# Patient Record
(demographics unavailable — no encounter records)

---

## 2025-01-21 NOTE — PLAN
[FreeTextEntry1] : Well woman annual examination.  Menorrhagia times multiple years requiring iron infusion therapy. History of thyroid nodules but euthyroid per the patient. A Pap smear is done. Transvaginal sonography suggests a possible  arcuate uterus and adenomyosis. Patient is currently scheduled for embolization of a splenic artery aneurysm. Patient is due for mammography and is referred for the same. She will return after she recuperates from her splenic artery embolization for an endometrial biopsy to assist in management of the heavy menses.

## 2025-01-21 NOTE — HISTORY OF PRESENT ILLNESS
[FreeTextEntry1] : Patient is here for initial examination She is a 41-year-old para 3-0-2-3 with 2 previous  sections.  The initial  was done for fetal distress. She notes that her periods are regular but have always been heavy has required iron infusions.  Apparently, this was never worked up for etiology. She is currently scheduled to undergo embolization of a splenic artery aneurysm which was incidentally discovered on a CT done for abdominal pain. Other than the heavy.  She has no current GYN complaints.  History of 2 spontaneous miscarriages She has been followed in the past for thyroid nodules and is not on any current thyroid medication. There are no allergies Surgical history is  section x 2 and a laparoscopic ovarian cyst, side uncertain Her last mammogram was about 2 years ago Social history is negative Currently abstinent

## 2025-01-21 NOTE — PHYSICAL EXAM
[Chaperone Present] : A chaperone was present in the examining room during all aspects of the physical examination [24313] : A chaperone was present during the pelvic exam. [FreeTextEntry2] : Lizbaeth Mina [Appropriately responsive] : appropriately responsive [Alert] : alert [No Acute Distress] : no acute distress [No Lymphadenopathy] : no lymphadenopathy [Soft] : soft [Non-tender] : non-tender [Non-distended] : non-distended [No HSM] : No HSM [No Lesions] : no lesions [No Mass] : no mass [FreeTextEntry3] : Thyroid feels very slightly enlarged [FreeTextEntry7] : Well-healed umbilical lap uroscopy scar and Pfannenstiel incision [Labia Majora] : normal [Labia Minora] : normal [Normal] : normal [Uterine Adnexae] : normal [FreeTextEntry4] : Mild vaginismus [FreeTextEntry6] : The uterus feels enlarged to approximately 6 weeks size is axial slightly bulky and irregular [FreeTextEntry9] : Rectal exam is negative and guaiac is negative

## 2025-02-07 NOTE — HISTORY OF PRESENT ILLNESS
[FreeTextEntry1] : 41F presents to evaluate RLE pain. History of venous insufficiency and splenic artery aneurysm. She is s/p coil embolization of splenic artery aneurysm on 1/28/25. Complains of intermittent left flank pain which is improving. Also complaining of right upper thigh discomfort after procedure. Right groin c/d/i. Swelling and bruising noted on right upper thigh from procedure. Denies leg swelling. She fell before her procedure and has some bruising on right distal thigh which is resolving. Denies claudication, rest pain or wounds. Does not take antiplatelets or anticoagulants

## 2025-02-07 NOTE — PHYSICAL EXAM
[1+] : left 1+ [2+] : left 2+ [] : bilaterally [Ankle Swelling On The Right] : mild [Alert] : alert [Oriented to Person] : oriented to person [Oriented to Place] : oriented to place [Oriented to Time] : oriented to time [Calm] : calm [Ankle Swelling (On Exam)] : not present [Varicose Veins Of Lower Extremities] : not present [de-identified] : NAD [de-identified] : NCAT [de-identified] : unlabored breathing [FreeTextEntry1] : right groin access site c/d/i right prox thigh mild swelling and bruising tender to touch resolving right distal thigh ecchymosis venous stasis changes  no significant leg edema

## 2025-02-07 NOTE — ASSESSMENT
[Arterial/Venous Disease] : arterial/venous disease [Medication Management] : medication management [FreeTextEntry1] : Impression - splenic artery aneurysm s/p coil embolization, incidental finding right cfv subacute dvt  seen and examined with Dr. Davila  Plan Start eliquis 5 mg BID (rx sent) Return to office in 6 weeks to re-evaluate bilateral lower extremities with venous doppler s/o DVT

## 2025-02-07 NOTE — ADDENDUM
[FreeTextEntry1] : Seen w NP Roseanna SARAH embo last week. Resid intermitt L flank pain slightly improved. R groin access site: resid pain/swelling/bruising.  No dist swelling. Had R thigh/calf bruise preop- resolving.  ABd S/NT/ND R groin: bruising resolving.  Access site clean.  No inc pulsatility. Dist previous bruise resolving. No edema. +DP Mot/sens ok.  R LE US: No PSA.  Subacute partial CFV clot.  WIll treat w ELiquis 5BID x 6 wks- then repeat US.  FLank post embolization pain appears approp post procuddure but will fu. WIll check MRA/CTA

## 2025-02-18 NOTE — PROCEDURE
[de-identified] : Suboptimal colposcopy. Endocervical curettage, await results If findings have been explained to the patient on the management is reviewed If ECC is negative repeat cotesting in 12 months Pelvic rest x 72 hours

## 2025-02-18 NOTE — PROCEDURE
[de-identified] : Suboptimal colposcopy. Endocervical curettage, await results If findings have been explained to the patient on the management is reviewed If ECC is negative repeat cotesting in 12 months Pelvic rest x 72 hours

## 2025-02-18 NOTE — PLAN
[FreeTextEntry1] : Patient presents for colposcopy for ASCUS Pap and high-risk HPV positive There is no prior history of abnormal Pap smears Lifetime history of 2 partners, currently abstinent Patient states that she has recently had the full gamut of STD testing via her primary care doctor and all was negative The etiology and natural history and management of HPV disease has been reviewed in detail with the patient and the colposcopy has been explained.  Consent is taken. The patient is placed in the lithotomy position and a bivalve speculum is inserted. The cervix is bathed in 3% acetic acid Colposcopy demonstrates a normal ectocervix with no acetowhite epithelium The squamocolumnar junction is inverted and not seen. An os finder was inserted to dilate the external os however the limits of the squamocolumnar junction was still not seen A thorough endocervical curettage was performed with a Mikhailian curette and the specimen is submitted to pathology. The procedure is well-tolerated and there were no complications The findings were discussed with the patient and she is to call in 1 week for the results. If the results are negative I have explained the importance of follow-up for cotesting in 1 year She was instructed to have pelvic rest for 3 days

## 2025-02-18 NOTE — ASSESSMENT
[FreeTextEntry1] : Patient presents for colposcopy for ASCUS Pap with high risk HPV. There is no prior history of abnormal Pap smear or HPV. There is a lifetime history of 2 partners and patient is currently abstinent The etiology and management of HPV related disease is discussed in detail and consent is taken for colposcopy after an explanation of the procedure Colposcopy: The cervix is bathed in 3% acetic acid after inserting a bivalve speculum The squamocolumnar junction is not visualized due to inversion of the cervix. There is no ectocervical acetowhite epithelium The cervix was dilated with an os finder however the SCJ is still not adequately visualized. Thorough circumferential curettage was performed with a Mikhailian curette and the specimen submitted to pathology The procedure is well-tolerated and there were no complications. Patient was advised to maintain pelvic rest for 72 hours. She will call in 1 week for the results. If the results are negative the importance of repeat cotesting in 1 year has been explained All of the patient's questions have been answered

## 2025-02-19 NOTE — ADDENDUM
[FreeTextEntry1] : Returned pt's call to office. Has new R groin pain/swelling x 2 days. Told pt to come to ER today for eval.

## 2025-03-03 NOTE — ADDENDUM
[FreeTextEntry1] : CAlled Feels much better L flank R groin: much better Will fu I&D Cx: Brannon waller
no

## 2025-03-21 NOTE — DATA REVIEWED
[FreeTextEntry1] : 2/7/2025 RLE arterial duplex no evidence of PSA or hematoma incidental finding subacute nonocclusive dvt in cfv  3/21/2025 bilateral lower extremity venous doppler RLE chronic cfv dvt LLE no dvt

## 2025-03-21 NOTE — HISTORY OF PRESENT ILLNESS
[FreeTextEntry1] : 41F presents to evaluate RLE dvt.  History of venous insufficiency s/p  mack le vein procedures (laser, phlebectomy) at outside facility (last intervention done 1.5 yr ago) and splenic artery aneurysm. She is s/p coil embolization of splenic artery aneurysm on 1/28/25. Left flank pain improved significantly. She experiences occasional left flank pain but not as bad as before. Pt went to the hospital last month for evaluation of right groin wound. She had incision and drainage of the right groin. Wound is now healed. She completed her course of antibiotics. Right upper thigh pain resolved. She takes Eliquis 5 mg BID for R common femoral vein dvt.   Pt reports bilateral leg heaviness, swelling, varicosities and spider veins. Symptoms are better in the morning and worse by afternoon from prolonged sitting and standing. Has not tried compression stockings or leg elevation. Pt is interested in vein procedures.  Denies claudication, rest pain or wounds.

## 2025-03-21 NOTE — ASSESSMENT
[FreeTextEntry1] : Impression - splenic artery aneurysm s/p coil embolization, symptomatic venous insufficiency with varicose and spider veins, RLE chronic cfv dvt  seen and examined with Dr. Davila  Plan Conservative management - leg elevation, calf muscle exercises, knee high 20-30 mm hg compression stockings, moisturize skin, exercise regimen Continue Eliquis 5 mg BID. Should be on eliquis for a total of 3 months Return to office in 2 months May 2025 to evaluate bilateral lower extremities with venous doppler s/o VI and plan for vein procedures [Arterial/Venous Disease] : arterial/venous disease [Medication Management] : medication management [Other: _____] : [unfilled]

## 2025-03-21 NOTE — PHYSICAL EXAM
[1+] : left 1+ [2+] : left 2+ [Ankle Swelling (On Exam)] : present [Ankle Swelling Bilaterally] : bilaterally  [Varicose Veins Of Lower Extremities] : bilaterally [] : bilaterally [Ankle Swelling On The Right] : mild [No Rash or Lesion] : No rash or lesion [Alert] : alert [Oriented to Person] : oriented to person [Oriented to Place] : oriented to place [Oriented to Time] : oriented to time [Calm] : calm [de-identified] : NAD [de-identified] : NCAT [de-identified] : unlabored breathing [FreeTextEntry1] : right groin incision well healed RLE ecchymosis resolved bilateral lower extremities soft, warm and nontender mild venous stasis changes with dry skin and hyperpigmentation bilateral varicosities and spider veins anterior/medial/lateral thigh and calf mild bilateral leg edema no wounds

## 2025-05-23 NOTE — DATA REVIEWED
[FreeTextEntry1] : 2/7/2025 RLE arterial duplex no evidence of PSA or hematoma incidental finding subacute nonocclusive dvt in cfv  3/21/2025 bilateral lower extremity venous doppler RLE chronic cfv dvt LLE no dvt  5/23/2025 bilateral lower extremity venous doppler RLE chronic dvt in CFV, no svt or reflux noted LLE no dvt, svt or reflux noted

## 2025-05-23 NOTE — HISTORY OF PRESENT ILLNESS
[FreeTextEntry1] : 41F presents to evaluate bilateral lower extremities.  History of venous insufficiency s/p  mack le vein procedures (laser, phlebectomy) at outside facility (last intervention done 1.5 yrs ago), splenic artery aneurysm and right common femoral vein dvt. She is s/p coil embolization of splenic artery aneurysm on 1/28/25. Left flank pain improved significantly and occurs occasionally. Also, with history of right groin infection s/p incision and drainage of the right groin. Right groin incision is fully healed. She takes Eliquis 5 mg BID for R common femoral vein dvt and has 1 week left of Eliquis.  Pt reports bilateral leg heaviness, swelling, tightness and discomfort. She has small varicosities and spider veins. The left leg is affected more than the right. She is bothered by the appearance of her veins. Symptoms are better in the morning and worse by afternoon from prolonged sitting and standing. Compliant with leg elevation and compression stockings. Pt is interested in vein procedures.  Denies claudication, rest pain or wounds.

## 2025-05-23 NOTE — ASSESSMENT
[Arterial/Venous Disease] : arterial/venous disease [Medication Management] : medication management [Other: _____] : [unfilled] [FreeTextEntry1] : Impression - splenic artery aneurysm s/p coil embolization, symptomatic venous insufficiency with varicose and spider veins, RLE chronic cfv dvt  seen and examined with Kandis ORNELAS  Plan Conservative management - leg elevation, calf muscle exercises, knee high 20-30 mm hg compression stockings, moisturize skin, exercise regimen Continue Eliquis 5 mg BID until finished. Pt has 1 week of eliquis left From vascular standpoint, can discontinue eliquis 5 mg BID after completing the last week of eliquis unless there are other medical indications for her to continue eliquis. Refer to PCP for AC management d/w pt indication, risks and benefits of bilateral stab phlebectomy with UGFS will start with left leg first, followed by right leg possible bilateral sclerotherapy in the future pt agrees and wishes to schedule bilateral stab phlebectomy with UGFS

## 2025-05-23 NOTE — PHYSICAL EXAM
[1+] : left 1+ [2+] : left 2+ [Ankle Swelling (On Exam)] : present [Ankle Swelling Bilaterally] : bilaterally  [Varicose Veins Of Lower Extremities] : bilaterally [] : bilaterally [Ankle Swelling On The Right] : mild [No Rash or Lesion] : No rash or lesion [Alert] : alert [Oriented to Person] : oriented to person [Oriented to Place] : oriented to place [Oriented to Time] : oriented to time [Calm] : calm [de-identified] : NAD [de-identified] : NCAT [de-identified] : unlabored breathing [FreeTextEntry1] : right groin incision well healed bilateral lower extremities soft, warm and nontender mild venous stasis changes with dry skin and hyperpigmentation small varicosities bilateral lateral thigh  cluster of spider veins left anterior shin and lateral calf mild bilateral leg edema no wounds

## 2025-07-01 NOTE — PROCEDURE
[FreeTextEntry1] : right stab phlebectomy with ultrasound guided foam sclerotherapy of the tributary veins [FreeTextEntry3] : Procedural safety checklist and time out completed: Confirmed patient identification (Patient Name, , and/or medical record number including, when possible, affirmation by patient or parent/family/other. Confirmed procedure with the patient. Consent present, accurate and signed. Confirmed special equipment and supplies are present. Sterility confirmed. Position verified. Site/ side is marked and visible and confirmed. Procedure confirmed by consent. Accurate consent including side and site. Review of medical records noting correct procedure including site and side. MD/PA verifies presence and review of imaging studies and or written report of imaging studies. Specify equipment are available for the planned procedure. MD/PA has marked the patient's procedural site and side. Agreement on the procedure to be performed. Time out completed. All of the above has been confirmed by the team. All patient-specific concerns have been addressed.   Indication:  right lower extremity varicose veins with inflammation, leg pain, leg swelling, and leg cramping.   Procedure: Stab phlebectomy right lower extremity with ultrasound guided foam sclerotherapy of the tributary veins    Ms. KALPESH EISENBERG is a 41 year old F with a history of symptomatic right lower extremity varicose veins. A trial of compression stockings, exercise, elevation, and pain medication was attempted without relief and definitive treatment with microphlebectomy was offered.   I have discussed the risks of the procedure at length with the patient. The risks discussed were inclusive of but not limited to infection, irritation at the site of infiltration of local anesthesia, and also rare risk of deep venous thrombosis and pulmonary emboli. Risks of itching, superficial thrombophlebitis, hyperpigmentation (darkening of the skin), allergic reactions, skin irritation due to extravasation of the sclerosant, air-embolism, and in rare cases deep vein thrombosis were also all discussed with the patient. The patient agrees to proceed with the procedure. The patient was escorted into the procedure room, the varicose veins for treatment were marked out and the patient placed on the examination table. The entire limb was prepped and draped in sterile fashion and a time out was called.   Local anesthesia using 20 cc 1% lidocaine was infiltrated using a 25-gauge needle over the previously marked prominent varicose vein sites. Multiple small stab incisions, each less than 1 cm in length was made at the noted sites. With the help of a vein hook, the vein was fished out at each of these sites, rolled over a narrow-tipped mosquito clamp and removed.   SIDE - RIGHT  SITE - CALF / THIGH LOCATION - MEDIAL / LATERAL / ANTERIOR / POSTERIOR Total Stab Incisions > 20     1.0 ml of 0.5% polidocanol was mixed with 4 ml air. The vein was cannulated with a 27G butterfly needle. The foam solution injected and appropriate visualization of the foam going into the vein was achieved using direct ultrasound guidance. This was repeated at 2 different sites until the entire _ 4 ml of the foam solution was injected. Every injected area was immediately compressed with gauze. Repeat ultrasound of the treated vein was performed confirming successful treatment. Medication: polidocanol 0.5% lot # [id number] exp 2027  Lateral thigh and medial knee   Hemostasis was secured with leg elevation and application of manual pressure. After assuring hemostasis, a sterile 4x4 was placed on the access sites and an ACE compression wrap was applied. Estimated blood loss: minimal. Patient tolerated procedure well. Patient was given post-procedure instructions and follow up appointment was scheduled. Estimated Blood Loss: minimal. Patient was given post-procedure instructions and follow up appointment with ultrasound was scheduled.